# Patient Record
Sex: MALE | Race: WHITE | NOT HISPANIC OR LATINO | Employment: FULL TIME | URBAN - METROPOLITAN AREA
[De-identification: names, ages, dates, MRNs, and addresses within clinical notes are randomized per-mention and may not be internally consistent; named-entity substitution may affect disease eponyms.]

---

## 2023-08-22 ENCOUNTER — APPOINTMENT (OUTPATIENT)
Dept: PRIMARY CARE | Facility: CLINIC | Age: 33
End: 2023-08-22
Payer: COMMERCIAL

## 2023-10-18 ENCOUNTER — LAB (OUTPATIENT)
Dept: LAB | Facility: LAB | Age: 33
End: 2023-10-18
Payer: COMMERCIAL

## 2023-10-18 ENCOUNTER — OFFICE VISIT (OUTPATIENT)
Dept: PRIMARY CARE | Facility: CLINIC | Age: 33
End: 2023-10-18
Payer: COMMERCIAL

## 2023-10-18 ENCOUNTER — TELEPHONE (OUTPATIENT)
Dept: PRIMARY CARE | Facility: CLINIC | Age: 33
End: 2023-10-18

## 2023-10-18 VITALS
HEIGHT: 72 IN | WEIGHT: 154 LBS | OXYGEN SATURATION: 98 % | DIASTOLIC BLOOD PRESSURE: 68 MMHG | BODY MASS INDEX: 20.86 KG/M2 | TEMPERATURE: 98 F | SYSTOLIC BLOOD PRESSURE: 116 MMHG | HEART RATE: 63 BPM | RESPIRATION RATE: 16 BRPM

## 2023-10-18 DIAGNOSIS — R79.89 ABNORMAL CBC: Primary | ICD-10-CM

## 2023-10-18 DIAGNOSIS — Z00.00 HEALTHCARE MAINTENANCE: ICD-10-CM

## 2023-10-18 DIAGNOSIS — M79.5 FOREIGN BODY (FB) IN SOFT TISSUE: Primary | ICD-10-CM

## 2023-10-18 LAB
25(OH)D3 SERPL-MCNC: 36 NG/ML (ref 30–100)
ALBUMIN SERPL BCP-MCNC: 4.8 G/DL (ref 3.4–5)
ALP SERPL-CCNC: 38 U/L (ref 33–120)
ALT SERPL W P-5'-P-CCNC: 11 U/L (ref 10–52)
ANION GAP SERPL CALC-SCNC: 8 MMOL/L (ref 10–20)
AST SERPL W P-5'-P-CCNC: 15 U/L (ref 9–39)
BILIRUB SERPL-MCNC: 1 MG/DL (ref 0–1.2)
BUN SERPL-MCNC: 15 MG/DL (ref 6–23)
CALCIUM SERPL-MCNC: 9.8 MG/DL (ref 8.6–10.3)
CHLORIDE SERPL-SCNC: 102 MMOL/L (ref 98–107)
CHOLEST SERPL-MCNC: 166 MG/DL (ref 0–199)
CHOLESTEROL/HDL RATIO: 2.9
CO2 SERPL-SCNC: 32 MMOL/L (ref 21–32)
CREAT SERPL-MCNC: 1.05 MG/DL (ref 0.5–1.3)
ERYTHROCYTE [DISTWIDTH] IN BLOOD BY AUTOMATED COUNT: 11.9 % (ref 11.5–14.5)
GFR SERPL CREATININE-BSD FRML MDRD: >90 ML/MIN/1.73M*2
GLUCOSE SERPL-MCNC: 96 MG/DL (ref 74–99)
HCT VFR BLD AUTO: 44.7 % (ref 41–52)
HDLC SERPL-MCNC: 56.7 MG/DL
HGB BLD-MCNC: 14.9 G/DL (ref 13.5–17.5)
LDLC SERPL CALC-MCNC: 98 MG/DL
MCH RBC QN AUTO: 31.2 PG (ref 26–34)
MCHC RBC AUTO-ENTMCNC: 33.3 G/DL (ref 32–36)
MCV RBC AUTO: 94 FL (ref 80–100)
NON HDL CHOLESTEROL: 109 MG/DL (ref 0–149)
NRBC BLD-RTO: 0 /100 WBCS (ref 0–0)
PLATELET # BLD AUTO: 202 X10*3/UL (ref 150–450)
PMV BLD AUTO: 11.6 FL (ref 7.5–11.5)
POC APPEARANCE, URINE: CLEAR
POC BILIRUBIN, URINE: NEGATIVE
POC BLOOD, URINE: NEGATIVE
POC COLOR, URINE: YELLOW
POC GLUCOSE, URINE: NEGATIVE MG/DL
POC KETONES, URINE: NEGATIVE MG/DL
POC LEUKOCYTES, URINE: NEGATIVE
POC NITRITE,URINE: NEGATIVE
POC PH, URINE: 6 PH
POC PROTEIN, URINE: NEGATIVE MG/DL
POC SPECIFIC GRAVITY, URINE: 1.02
POC UROBILINOGEN, URINE: 0.2 EU/DL
POTASSIUM SERPL-SCNC: 4.4 MMOL/L (ref 3.5–5.3)
PROT SERPL-MCNC: 7.1 G/DL (ref 6.4–8.2)
RBC # BLD AUTO: 4.77 X10*6/UL (ref 4.5–5.9)
SODIUM SERPL-SCNC: 138 MMOL/L (ref 136–145)
TRIGL SERPL-MCNC: 55 MG/DL (ref 0–149)
TSH SERPL-ACNC: 2.6 MIU/L (ref 0.44–3.98)
VLDL: 11 MG/DL (ref 0–40)
WBC # BLD AUTO: 4.1 X10*3/UL (ref 4.4–11.3)

## 2023-10-18 PROCEDURE — 85027 COMPLETE CBC AUTOMATED: CPT

## 2023-10-18 PROCEDURE — 84443 ASSAY THYROID STIM HORMONE: CPT

## 2023-10-18 PROCEDURE — 80053 COMPREHEN METABOLIC PANEL: CPT

## 2023-10-18 PROCEDURE — 80061 LIPID PANEL: CPT

## 2023-10-18 PROCEDURE — 99385 PREV VISIT NEW AGE 18-39: CPT | Performed by: FAMILY MEDICINE

## 2023-10-18 PROCEDURE — 81002 URINALYSIS NONAUTO W/O SCOPE: CPT | Performed by: FAMILY MEDICINE

## 2023-10-18 PROCEDURE — 36415 COLL VENOUS BLD VENIPUNCTURE: CPT

## 2023-10-18 PROCEDURE — 82306 VITAMIN D 25 HYDROXY: CPT

## 2023-10-18 PROCEDURE — 1036F TOBACCO NON-USER: CPT | Performed by: FAMILY MEDICINE

## 2023-10-18 ASSESSMENT — PATIENT HEALTH QUESTIONNAIRE - PHQ9
SUM OF ALL RESPONSES TO PHQ9 QUESTIONS 1 AND 2: 0
1. LITTLE INTEREST OR PLEASURE IN DOING THINGS: NOT AT ALL
2. FEELING DOWN, DEPRESSED OR HOPELESS: NOT AT ALL

## 2023-10-18 NOTE — PATIENT INSTRUCTIONS
Welcome to our practice!    Today we performed your Annual Physical Exam.  Your preventative health care, labs and vaccinations have been reviewed and are up to date.    For the bee bee in your leg I have referred you to general surgery    Tdap and flu shot and covid boosters all recommended you deferred vaccines today.      Follow up in 1 year for your Complete Physical Exam

## 2023-10-18 NOTE — PROGRESS NOTES
Subjective   Patient ID: Topher Allen is a 33 y.o. male who presents for Annual Exam ([facility] ).    Dentist and Eye Doctor appointments: UTD  Immunizations: defers today   Diet: pretty good but eats a lot of meat  Exercise: goes to the gym weights mostly and a little bit cardio  Supplements: protein and creatinine  Sexually Active: Yes, female, No STD concerns  Cancer Screening:    Prostate: N/A   Colon: N/A   Testicular: self exam at home   Skin:  wears sunscreen    He has a bee bee in his right leg that has been there since he was a child and he would like it removed.  He couldn't remove it himself         Review of Systems    Objective   /68   Pulse 63   Temp 36.7 °C (98 °F)   Resp 16   Ht 1.829 m (6')   Wt 69.9 kg (154 lb)   SpO2 98%   BMI 20.89 kg/m²     Physical Exam  Constitutional:       General: He is not in acute distress.     Appearance: He is well-developed. He is not diaphoretic.   HENT:      Head: Normocephalic and atraumatic.      Right Ear: Tympanic membrane normal.      Left Ear: Tympanic membrane normal.      Nose: Nose normal.      Mouth/Throat:      Mouth: Mucous membranes are moist.   Eyes:      General: No scleral icterus.     Pupils: Pupils are equal, round, and reactive to light.   Neck:      Thyroid: No thyromegaly.      Vascular: No JVD.   Cardiovascular:      Rate and Rhythm: Normal rate and regular rhythm.      Heart sounds: Normal heart sounds. No murmur heard.     No friction rub. No gallop.   Pulmonary:      Effort: Pulmonary effort is normal. No respiratory distress.      Breath sounds: Normal breath sounds. No wheezing or rales.   Chest:      Chest wall: No tenderness.   Abdominal:      General: Bowel sounds are normal. There is no distension.      Palpations: Abdomen is soft. There is no mass.      Tenderness: There is no abdominal tenderness. There is no rebound.   Musculoskeletal:         General: Normal range of motion.      Cervical back: Normal range  of motion and neck supple.   Lymphadenopathy:      Cervical: No cervical adenopathy.   Skin:     General: Skin is warm and dry.   Neurological:      General: No focal deficit present.      Mental Status: He is alert and oriented to person, place, and time.      Deep Tendon Reflexes: Reflexes normal.   Psychiatric:         Mood and Affect: Mood normal.         Behavior: Behavior normal.         Assessment/Plan   Diagnoses and all orders for this visit:  Foreign body (FB) in soft tissue  -     Referral to General Surgery; Future  Healthcare maintenance  -     POCT UA (nonautomated) manually resulted  -     CBC; Future  -     Comprehensive Metabolic Panel; Future  -     Lipid Panel; Future  -     Vitamin D 25 hydroxy; Future  -     TSH with reflex to Free T4 if abnormal; Future

## 2023-10-19 ENCOUNTER — OFFICE VISIT (OUTPATIENT)
Dept: SURGERY | Facility: CLINIC | Age: 33
End: 2023-10-19
Payer: COMMERCIAL

## 2023-10-19 VITALS
RESPIRATION RATE: 17 BRPM | DIASTOLIC BLOOD PRESSURE: 82 MMHG | SYSTOLIC BLOOD PRESSURE: 130 MMHG | BODY MASS INDEX: 20.86 KG/M2 | OXYGEN SATURATION: 99 % | HEART RATE: 71 BPM | TEMPERATURE: 98.2 F | WEIGHT: 154 LBS | HEIGHT: 72 IN

## 2023-10-19 DIAGNOSIS — S80.851A FOREIGN BODY OF RIGHT LOWER LEG, INITIAL ENCOUNTER: ICD-10-CM

## 2023-10-19 DIAGNOSIS — M79.5 FOREIGN BODY (FB) IN SOFT TISSUE: ICD-10-CM

## 2023-10-19 PROCEDURE — 99212 OFFICE O/P EST SF 10 MIN: CPT | Performed by: SURGERY

## 2023-10-19 PROCEDURE — 10120 INC&RMVL FB SUBQ TISS SMPL: CPT | Performed by: SURGERY

## 2023-10-19 PROCEDURE — 1036F TOBACCO NON-USER: CPT | Performed by: SURGERY

## 2023-10-19 ASSESSMENT — PAIN SCALES - GENERAL: PAINLEVEL: 0-NO PAIN
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
## 2023-10-27 ENCOUNTER — APPOINTMENT (OUTPATIENT)
Dept: SURGERY | Facility: CLINIC | Age: 33
End: 2023-10-27
Payer: COMMERCIAL

## 2023-11-10 ENCOUNTER — LAB (OUTPATIENT)
Dept: LAB | Facility: LAB | Age: 33
End: 2023-11-10
Payer: COMMERCIAL

## 2023-11-10 DIAGNOSIS — R79.89 ABNORMAL CBC: ICD-10-CM

## 2023-11-10 LAB
BASOPHILS # BLD AUTO: 0.03 X10*3/UL (ref 0–0.1)
BASOPHILS NFR BLD AUTO: 0.8 %
EOSINOPHIL # BLD AUTO: 0.03 X10*3/UL (ref 0–0.7)
EOSINOPHIL NFR BLD AUTO: 0.8 %
ERYTHROCYTE [DISTWIDTH] IN BLOOD BY AUTOMATED COUNT: 11.7 % (ref 11.5–14.5)
HCT VFR BLD AUTO: 43.3 % (ref 41–52)
HGB BLD-MCNC: 14.5 G/DL (ref 13.5–17.5)
IMM GRANULOCYTES # BLD AUTO: 0 X10*3/UL (ref 0–0.7)
IMM GRANULOCYTES NFR BLD AUTO: 0 % (ref 0–0.9)
LYMPHOCYTES # BLD AUTO: 1.24 X10*3/UL (ref 1.2–4.8)
LYMPHOCYTES NFR BLD AUTO: 34.3 %
MCH RBC QN AUTO: 31.3 PG (ref 26–34)
MCHC RBC AUTO-ENTMCNC: 33.5 G/DL (ref 32–36)
MCV RBC AUTO: 94 FL (ref 80–100)
MONOCYTES # BLD AUTO: 0.35 X10*3/UL (ref 0.1–1)
MONOCYTES NFR BLD AUTO: 9.7 %
NEUTROPHILS # BLD AUTO: 1.96 X10*3/UL (ref 1.2–7.7)
NEUTROPHILS NFR BLD AUTO: 54.4 %
NRBC BLD-RTO: 0 /100 WBCS (ref 0–0)
PLATELET # BLD AUTO: 192 X10*3/UL (ref 150–450)
RBC # BLD AUTO: 4.63 X10*6/UL (ref 4.5–5.9)
WBC # BLD AUTO: 3.6 X10*3/UL (ref 4.4–11.3)

## 2023-11-10 PROCEDURE — 85025 COMPLETE CBC W/AUTO DIFF WBC: CPT

## 2023-11-10 PROCEDURE — 36415 COLL VENOUS BLD VENIPUNCTURE: CPT

## 2023-11-13 DIAGNOSIS — R79.89 ABNORMAL CBC: Primary | ICD-10-CM

## 2024-10-16 ASSESSMENT — PROMIS GLOBAL HEALTH SCALE
RATE_SOCIAL_SATISFACTION: GOOD
CARRYOUT_SOCIAL_ACTIVITIES: GOOD
RATE_QUALITY_OF_LIFE: GOOD
RATE_PHYSICAL_HEALTH: GOOD
RATE_MENTAL_HEALTH: GOOD
RATE_AVERAGE_PAIN: 0
RATE_AVERAGE_FATIGUE: MODERATE
RATE_GENERAL_HEALTH: GOOD
EMOTIONAL_PROBLEMS: SOMETIMES
CARRYOUT_PHYSICAL_ACTIVITIES: COMPLETELY

## 2024-10-23 ENCOUNTER — APPOINTMENT (OUTPATIENT)
Dept: PRIMARY CARE | Facility: CLINIC | Age: 34
End: 2024-10-23
Payer: COMMERCIAL

## 2024-10-23 VITALS
HEIGHT: 72 IN | SYSTOLIC BLOOD PRESSURE: 110 MMHG | HEART RATE: 66 BPM | TEMPERATURE: 98.2 F | RESPIRATION RATE: 16 BRPM | BODY MASS INDEX: 20.72 KG/M2 | WEIGHT: 153 LBS | DIASTOLIC BLOOD PRESSURE: 68 MMHG | OXYGEN SATURATION: 97 %

## 2024-10-23 DIAGNOSIS — R79.89 ABNORMAL CBC: ICD-10-CM

## 2024-10-23 DIAGNOSIS — Z00.00 HEALTHCARE MAINTENANCE: Primary | ICD-10-CM

## 2024-10-23 LAB
POC APPEARANCE, URINE: CLEAR
POC BILIRUBIN, URINE: NEGATIVE
POC BLOOD, URINE: NEGATIVE
POC COLOR, URINE: YELLOW
POC GLUCOSE, URINE: NEGATIVE MG/DL
POC KETONES, URINE: NEGATIVE MG/DL
POC LEUKOCYTES, URINE: NEGATIVE
POC NITRITE,URINE: NEGATIVE
POC PH, URINE: 7 PH
POC PROTEIN, URINE: NEGATIVE MG/DL
POC SPECIFIC GRAVITY, URINE: 1.01
POC UROBILINOGEN, URINE: 0.2 EU/DL

## 2024-10-23 PROCEDURE — 90715 TDAP VACCINE 7 YRS/> IM: CPT | Performed by: FAMILY MEDICINE

## 2024-10-23 PROCEDURE — 3008F BODY MASS INDEX DOCD: CPT | Performed by: FAMILY MEDICINE

## 2024-10-23 PROCEDURE — 99395 PREV VISIT EST AGE 18-39: CPT | Performed by: FAMILY MEDICINE

## 2024-10-23 PROCEDURE — 90471 IMMUNIZATION ADMIN: CPT | Performed by: FAMILY MEDICINE

## 2024-10-23 PROCEDURE — 81002 URINALYSIS NONAUTO W/O SCOPE: CPT | Performed by: FAMILY MEDICINE

## 2024-10-23 PROCEDURE — 1036F TOBACCO NON-USER: CPT | Performed by: FAMILY MEDICINE

## 2024-10-23 ASSESSMENT — PATIENT HEALTH QUESTIONNAIRE - PHQ9
2. FEELING DOWN, DEPRESSED OR HOPELESS: NOT AT ALL
1. LITTLE INTEREST OR PLEASURE IN DOING THINGS: NOT AT ALL
SUM OF ALL RESPONSES TO PHQ9 QUESTIONS 1 AND 2: 0

## 2024-10-23 NOTE — PATIENT INSTRUCTIONS
Today we performed your Annual Physical Exam.  Your preventative health care, labs and vaccinations have been reviewed and are up to date.      You were given a Tdap vaccine today and this is good for 10 years.    Flu shot and covid boosters are recommended    Follow up in 1 year for your Complete Physical Exam

## 2024-10-23 NOTE — PROGRESS NOTES
No current outpatient medications on file.     No current facility-administered medications for this visit.   Subjective   Patient ID: Topher Allen is a 34 y.o. male who presents for Annual Exam.    Dentist and Eye Doctor appointments: Dentist utd no eye doctor  Immunizations: Due for Tdap and flu shot defers flu  Diet: Decent diet with a good variety  Exercise: home workouts strength and cardio  Supplements: Protein  Sexually Active:  Yes, No std concerns, female  Cancer Screening:    Prostate: N/A   Colon: N/A   Testicular: Home self exams   Skin:  sunscreen, has some moles on chest    HPI     Review of Systems    Objective   /68   Pulse 66   Temp 36.8 °C (98.2 °F)   Resp 16   Ht 1.829 m (6')   Wt 69.4 kg (153 lb)   SpO2 97%   BMI 20.75 kg/m²     Physical Exam  Constitutional:       General: He is not in acute distress.     Appearance: He is well-developed. He is not diaphoretic.   HENT:      Head: Normocephalic and atraumatic.      Right Ear: Tympanic membrane normal.      Left Ear: Tympanic membrane normal.      Nose: Nose normal.      Mouth/Throat:      Mouth: Mucous membranes are moist.   Eyes:      General: No scleral icterus.     Pupils: Pupils are equal, round, and reactive to light.   Neck:      Thyroid: No thyromegaly.      Vascular: No JVD.   Cardiovascular:      Rate and Rhythm: Normal rate and regular rhythm.      Heart sounds: Normal heart sounds. No murmur heard.     No friction rub. No gallop.   Pulmonary:      Effort: Pulmonary effort is normal. No respiratory distress.      Breath sounds: Normal breath sounds. No wheezing or rales.   Chest:      Chest wall: No tenderness.   Abdominal:      General: Bowel sounds are normal. There is no distension.      Palpations: Abdomen is soft. There is no mass.      Tenderness: There is no abdominal tenderness. There is no rebound.   Musculoskeletal:         General: Normal range of motion.      Cervical back: Normal range of motion and neck  supple.   Lymphadenopathy:      Cervical: No cervical adenopathy.   Skin:     General: Skin is warm and dry.   Neurological:      General: No focal deficit present.      Mental Status: He is alert and oriented to person, place, and time.      Deep Tendon Reflexes: Reflexes normal.   Psychiatric:         Mood and Affect: Mood normal.         Behavior: Behavior normal.         Assessment/Plan   Diagnoses and all orders for this visit:  Healthcare maintenance  -     POCT UA (nonautomated) manually resulted  -     CBC; Future  -     Comprehensive Metabolic Panel; Future  -     Lipid Panel; Future  -     Vitamin D 25 hydroxy; Future  -     TSH with reflex to Free T4 if abnormal; Future  Abnormal CBC  -     Vitamin B12; Future  Other orders  -     Tdap vaccine, age 7 years and older  (BOOSTRIX)